# Patient Record
Sex: FEMALE | Race: WHITE | NOT HISPANIC OR LATINO | URBAN - METROPOLITAN AREA
[De-identification: names, ages, dates, MRNs, and addresses within clinical notes are randomized per-mention and may not be internally consistent; named-entity substitution may affect disease eponyms.]

---

## 2017-02-13 ENCOUNTER — IMPORTED ENCOUNTER (OUTPATIENT)
Dept: URBAN - METROPOLITAN AREA CLINIC 1 | Facility: CLINIC | Age: 62
End: 2017-02-13

## 2017-02-13 PROBLEM — H25.813: Noted: 2017-02-13

## 2017-02-13 PROBLEM — H40.023: Noted: 2017-02-13

## 2017-02-13 PROBLEM — H04.123: Noted: 2017-02-13

## 2017-02-13 PROBLEM — H43.811: Noted: 2017-02-13

## 2017-02-13 PROCEDURE — 92015 DETERMINE REFRACTIVE STATE: CPT

## 2017-02-13 PROCEDURE — 92014 COMPRE OPH EXAM EST PT 1/>: CPT

## 2017-02-13 NOTE — PATIENT DISCUSSION
1.  Glaucoma Suspect OU : (CD 0.6/0.7) Fm Hx. Risk of cupping. IOP stable on no meds. Cont to observe on no meds. Patient is considered high risk. 2.  Cataract OU: Observe for now without intervention. The patient was advised to contact us if any change or worsening of vision3. Dry Eyes OU - Continue ATs TID OU routinely. 4.  PVD w/o Tear OD -stable 5. H/o RK OU 6. Progressive Non-adapter Letter to PCP MRX for glasses given advised patient to get glasses rx as she is not legal to drive without glasses. Return for an appointment in 1 yr 27 OCT with Dr. Maggie Ornelas.

## 2018-02-12 ENCOUNTER — IMPORTED ENCOUNTER (OUTPATIENT)
Dept: URBAN - METROPOLITAN AREA CLINIC 1 | Facility: CLINIC | Age: 63
End: 2018-02-12

## 2018-02-12 PROBLEM — H43.811: Noted: 2018-02-12

## 2018-02-12 PROBLEM — H25.813: Noted: 2018-02-12

## 2018-02-12 PROBLEM — H40.023: Noted: 2018-02-12

## 2018-02-12 PROBLEM — H04.123: Noted: 2018-02-12

## 2018-02-12 PROCEDURE — 92014 COMPRE OPH EXAM EST PT 1/>: CPT

## 2018-02-12 PROCEDURE — 92015 DETERMINE REFRACTIVE STATE: CPT

## 2018-02-12 PROCEDURE — 92133 CPTRZD OPH DX IMG PST SGM ON: CPT

## 2018-02-12 NOTE — PATIENT DISCUSSION
1.  Glaucoma Suspect OU (CD 0.60/0.75): OCT remains normal. Family hx. Patient is considered high risk. 2. Cataract OU: Observe for now without intervention. The patient was advised to contact us if any change or worsening of vision3. Dry Eyes OU -The use/continuation of artificial tears were recommended. 4.  PVD w/o Tear OD - RD precautions. 5.  H/o RK OU 6. Progressive Non-adapterMRX for glasses givenReturn for an appointment in 1 year 30/HVF/glare with Dr. Moses Zelaya.

## 2019-02-07 ENCOUNTER — IMPORTED ENCOUNTER (OUTPATIENT)
Dept: URBAN - METROPOLITAN AREA CLINIC 1 | Facility: CLINIC | Age: 64
End: 2019-02-07

## 2019-02-07 PROBLEM — H43.813: Noted: 2019-02-07

## 2019-02-07 PROCEDURE — 92012 INTRM OPH EXAM EST PATIENT: CPT

## 2019-02-07 NOTE — PATIENT DISCUSSION
1.  PVD OU (OD- Old OS- New) no holes tears or detachments-  RD precautions. Reassurance. 2.  Glaucoma Suspect OU (CD 0.60/0.75): OCT remains normal. Family hx. Patient is considered high risk. 3. Cataract OU: Observe for now without intervention. The patient was advised to contact us if any change or worsening of vision4. Dry Eyes OU - Cont ATs TID OU Routinely. 5.  H/o RK OU 6. Progressive Non-adapterReturn for an appointment as scheduled with Dr. Omid Raman.

## 2019-02-25 ENCOUNTER — IMPORTED ENCOUNTER (OUTPATIENT)
Dept: URBAN - METROPOLITAN AREA CLINIC 1 | Facility: CLINIC | Age: 64
End: 2019-02-25

## 2019-02-25 PROBLEM — H43.813: Noted: 2019-02-25

## 2019-02-25 PROCEDURE — 92012 INTRM OPH EXAM EST PATIENT: CPT

## 2019-02-25 NOTE — PATIENT DISCUSSION
1.  PVD OU (OD- Old OS- New) no holes tears or detachments noted today-  RD precautions. Reassurance. 2.  Glaucoma Suspect OU (CD 0.60/0.75)Family hx. IOP stable on no gtts. Cont to observe. Patient is considered high risk. 3. Cataract OU: Observe for now without intervention. The patient was advised to contact us if any change or worsening of vision4. Dry Eyes OU - Cont ATs TID OU Routinely. 5.  H/o RK OU 6. Progressive Non-adapterCancel 3/26 appt & move out to 6 moReturn for an appointment in 6 mo 30 Vf 24-2 OU with Dr. Javier Syed.

## 2019-08-30 ENCOUNTER — IMPORTED ENCOUNTER (OUTPATIENT)
Dept: URBAN - METROPOLITAN AREA CLINIC 1 | Facility: CLINIC | Age: 64
End: 2019-08-30

## 2019-08-30 PROBLEM — H40.023: Noted: 2019-08-30

## 2019-08-30 PROBLEM — H25.813: Noted: 2019-08-30

## 2019-08-30 PROCEDURE — 92014 COMPRE OPH EXAM EST PT 1/>: CPT

## 2019-08-30 PROCEDURE — 92083 EXTENDED VISUAL FIELD XM: CPT

## 2019-08-30 NOTE — PATIENT DISCUSSION
1.  Glaucoma Suspect OU (CD 0.60/0.75)Family Hx (Aunt). IOP stable on no gtts. VF WNL OD OS possible early superior arcuate vs. artifact. Treat with any future progression but will cont to observe off gtts OU at this time. Patient is considered high risk. 2. Cataract OU: Observe for now without intervention. The patient was advised to contact us if any change or worsening of vision3. PVD OU- Stable RD precautions. Reassurance. of vision4. Dry Eyes OU - Cont ATs TID OU Routinely. 5.  H/o RK OU 6. Progressive Non-adapterMRx deferred today Return for an appointment in 6 mo 10 dfe OCT glare with Dr. Jackie Zamora.

## 2020-02-28 ENCOUNTER — IMPORTED ENCOUNTER (OUTPATIENT)
Dept: URBAN - METROPOLITAN AREA CLINIC 1 | Facility: CLINIC | Age: 65
End: 2020-02-28

## 2020-02-28 PROBLEM — H04.123: Noted: 2020-02-28

## 2020-02-28 PROBLEM — H25.813: Noted: 2020-02-28

## 2020-02-28 PROBLEM — H40.023: Noted: 2020-02-28

## 2020-02-28 PROBLEM — H43.813: Noted: 2020-02-28

## 2020-02-28 PROCEDURE — 92015 DETERMINE REFRACTIVE STATE: CPT

## 2020-02-28 PROCEDURE — 92133 CPTRZD OPH DX IMG PST SGM ON: CPT

## 2020-02-28 PROCEDURE — 92012 INTRM OPH EXAM EST PATIENT: CPT

## 2020-02-28 NOTE — PATIENT DISCUSSION
1.  Glaucoma Suspect OU (CD 0.60/0.75): OCT WNL OU. IOP stable. Family hx (Aunt). Patient is considered high risk. Condition was discussed with patient and patient understands. Will continue to monitor patient for any progression in condition. Patient was advised to call us with any problems questions or concerns. 2.  Cataract OU:  Visually Significant secondary to glare discussed the risks benefits alternatives and limitations of cataract surgery. The patient stated a full understanding and a desire to proceed with the procedure. The patient will need to return for preop appointment with cataract measurements and to have any additional questions answered and start pre-operative eye drops as directed. **Guarded visual prgonosis due to h/o RK OU.  *Not MF or LenSx candidate Phaco PCL OS then OD Otherwise follow-up 6 months 30/glare 3. Dry Eyes OU - Recommend ATs TID OU routinely 4. PVD OU - RD precautions. 5.  H/o RK OU 6. Progressive Non-adapterReturn for an appointment for Ascan/H and P. with Dr. Jackie Alexis.

## 2020-05-18 ENCOUNTER — IMPORTED ENCOUNTER (OUTPATIENT)
Dept: URBAN - METROPOLITAN AREA CLINIC 1 | Facility: CLINIC | Age: 65
End: 2020-05-18

## 2020-05-18 PROBLEM — H25.813: Noted: 2020-05-18

## 2020-05-18 PROCEDURE — 92136 OPHTHALMIC BIOMETRY: CPT

## 2020-05-18 NOTE — PATIENT DISCUSSION
1. Cataract OU:  Visually Significant secondary to glare discussed the risks benefits alternatives and limitations of cataract surgery. The patient stated a full understanding and a desire to proceed with the procedure. Discussed with patient if PO Gtts are more than $120 for all three combined when filling at their Pharmacy please call our office to request generic substitutions. Guarded visual prognosis due to history of refractive surgery. The accuracy of the IOL selection may be affected by prior refractive surgery and patient should expect to wear glasses after Phaco. Thoroughly discussed with patient patient understood. Phaco PCL OS then OD Phaco PCL OS 2. Glaucoma Suspect OU (CD 0.60/0.75): IOP stable. Family hx (Aunt). Patient is considered high risk. 3.  Dry Eyes OU - Recommend ATs TID OU routinely 4. PVD OU - RD precautions. 5.  H/o RK OU 6.   Progressive Non-adapter

## 2020-05-27 ENCOUNTER — IMPORTED ENCOUNTER (OUTPATIENT)
Dept: URBAN - METROPOLITAN AREA CLINIC 1 | Facility: CLINIC | Age: 65
End: 2020-05-27

## 2020-05-28 ENCOUNTER — IMPORTED ENCOUNTER (OUTPATIENT)
Dept: URBAN - METROPOLITAN AREA CLINIC 1 | Facility: CLINIC | Age: 65
End: 2020-05-28

## 2020-05-28 PROBLEM — Z96.1: Noted: 2020-05-28

## 2020-05-28 PROCEDURE — 99024 POSTOP FOLLOW-UP VISIT: CPT

## 2020-05-28 NOTE — PATIENT DISCUSSION
POD#1 CE/IOL Toric OS doing well. Use Lotemax BID OS Prolensa Qdaily OS Ocuflox TID OS: Use all three gtts through completion of PO gtt chart regimen/ Per our instructions given to patient.   Post op Warnings Reiterated RTC as scheduled

## 2020-06-04 ENCOUNTER — IMPORTED ENCOUNTER (OUTPATIENT)
Dept: URBAN - METROPOLITAN AREA CLINIC 1 | Facility: CLINIC | Age: 65
End: 2020-06-04

## 2020-06-04 PROBLEM — H25.811: Noted: 2020-06-04

## 2020-06-04 PROCEDURE — 92136 OPHTHALMIC BIOMETRY: CPT

## 2020-06-04 NOTE — PATIENT DISCUSSION
1.  Cataract OD: Visually Significant secondary to glare discussed the risks benefits alternatives and limitations of cataract surgery. The patient stated a full understanding and a desire to proceed with the procedure. Discussed with patient if PO Gtts are more than $120 for all three combined when filling at their Pharmacy please call our office to request generic substitutions. Guarded visual prognosis due to history of refractive surgery. The accuracy of the IOL selection may be affected by prior refractive surgery and patient should expect to wear glasses after Phaco. Thoroughly discussed with patient patient understood. Phaco PCL OD 2. POW#3  CE/IOL OS (Toric) doing well. *H/o RK  Use Lotemax BID OS and Bromsite Qdaily OS: Use gtts through completion of PO gtt regimen.   F/u as scheduled 2nd eye

## 2020-06-19 ENCOUNTER — IMPORTED ENCOUNTER (OUTPATIENT)
Dept: URBAN - METROPOLITAN AREA CLINIC 1 | Facility: CLINIC | Age: 65
End: 2020-06-19

## 2020-06-20 ENCOUNTER — IMPORTED ENCOUNTER (OUTPATIENT)
Dept: URBAN - METROPOLITAN AREA CLINIC 1 | Facility: CLINIC | Age: 65
End: 2020-06-20

## 2020-06-20 PROBLEM — Z96.1: Noted: 2020-06-20

## 2020-06-20 PROCEDURE — 99024 POSTOP FOLLOW-UP VISIT: CPT

## 2020-07-14 ENCOUNTER — IMPORTED ENCOUNTER (OUTPATIENT)
Dept: URBAN - METROPOLITAN AREA CLINIC 1 | Facility: CLINIC | Age: 65
End: 2020-07-14

## 2020-07-14 PROBLEM — Z96.1: Noted: 2020-07-14

## 2020-07-14 PROCEDURE — 99024 POSTOP FOLLOW-UP VISIT: CPT

## 2020-07-14 NOTE — PATIENT DISCUSSION
POM#1 CE/IOL OU (Toric OU) doing well. *H/o RK OU* Use Lotemax BID OD and Bromsite Qdaily OD: Use gtts through completion of PO gtt regimen. MRX for glasses given. Return for an appointment in February 30/OCT with Dr. Claudeen Cobble.

## 2021-02-16 ENCOUNTER — IMPORTED ENCOUNTER (OUTPATIENT)
Dept: URBAN - METROPOLITAN AREA CLINIC 1 | Facility: CLINIC | Age: 66
End: 2021-02-16

## 2021-02-16 PROBLEM — Z96.1: Noted: 2021-02-16

## 2021-02-16 PROBLEM — H16.143: Noted: 2021-02-16

## 2021-02-16 PROBLEM — H04.123: Noted: 2021-02-16

## 2021-02-16 PROBLEM — H26.493: Noted: 2021-02-16

## 2021-02-16 PROBLEM — H40.023: Noted: 2021-02-16

## 2021-02-16 PROCEDURE — 92133 CPTRZD OPH DX IMG PST SGM ON: CPT

## 2021-02-16 PROCEDURE — 99214 OFFICE O/P EST MOD 30 MIN: CPT

## 2021-02-16 NOTE — PATIENT DISCUSSION
1.  Glaucoma Suspect OU (CD 0.60/0.75): IOP stable. OCT today minimal thinning OD WNL OS. Family hx (Aunt). Patient is considered high risk. 2.  ENRRIQUE w/ PEK OU - Recommend ATs TID OU routinely3. PCO OU - (Posterior Capsule Opacification)   Observe and consider yag cap when pt feels pco visually significant and visual acuity decreases to appropriate level. 4. Pseudophakia OU - Toric OU Doing well. 5.  PVD OU - RD precautions. 6.  H/o RK OU 7. Progressive Non-adapter. Return for an appointment in 1 year for a 30 with Dr. Omid Min.

## 2022-02-22 ENCOUNTER — ESTABLISHED PATIENT (OUTPATIENT)
Dept: URBAN - METROPOLITAN AREA CLINIC 1 | Facility: CLINIC | Age: 67
End: 2022-02-22

## 2022-02-22 DIAGNOSIS — Z96.1: ICD-10-CM

## 2022-02-22 DIAGNOSIS — H40.023: ICD-10-CM

## 2022-02-22 DIAGNOSIS — H04.123: ICD-10-CM

## 2022-02-22 DIAGNOSIS — H26.493: ICD-10-CM

## 2022-02-22 DIAGNOSIS — H43.813: ICD-10-CM

## 2022-02-22 PROCEDURE — 92014 COMPRE OPH EXAM EST PT 1/>: CPT

## 2022-02-22 PROCEDURE — 92133 CPTRZD OPH DX IMG PST SGM ON: CPT

## 2022-02-22 ASSESSMENT — TONOMETRY
OD_IOP_MMHG: 10
OS_IOP_MMHG: 10

## 2022-02-22 ASSESSMENT — VISUAL ACUITY
OD_SC: 20/20-1
OS_SC: 20/25
OS_CC: J1
OD_CC: J1

## 2022-02-22 NOTE — PATIENT DISCUSSION
Observe and consider YAG cap when pt feels pco visually significant and visual acuity decreases to appropriate level.

## 2022-02-22 NOTE — PATIENT DISCUSSION
(CD 0.6,0.75) IOP stable OU. OCT today remains without progression. (+) Family Hx (Aunt). Patient is considered high risk. Condition was discussed with patient and patient understands. Will continue to monitor patient for any progression in condition. Patient was advised to call us with any problems, questions, or concerns.

## 2022-04-02 ASSESSMENT — VISUAL ACUITY
OS_CC: 20/20
OD_CC: 20/60
OD_GLARE: 20/100
OS_PH: SC 20/25
OS_GLARE: 20/100
OS_SC: 20/20-1
OD_SC: 20/40
OS_CC: J6
OD_CC: 20/40
OS_GLARE: 20/150
OD_GLARE: 20/150
OS_GLARE: 20/150
OS_CC: 20/25
OD_CC: 20/70
OD_SC: 20/30
OS_CC: 20/70+2
OD_CC: 20/20-1
OS_SC: 20/30
OD_CC: 20/25
OS_CC: 20/70
OS_CC: 20/25
OD_CC: 20/25
OS_GLARE: 20/150
OS_CC: 20/50
OS_SC: 20/40
OS_SC: 20/25
OD_CC: 20/60
OS_CC: 20/60
OS_GLARE: 20/100
OD_GLARE: 20/150
OS_SC: 20/30
OS_SC: 20/30
OS_CC: 20/70+2
OS_CC: 20/20
OD_CC: J3
OD_GLARE: 20/150
OD_GLARE: 20/100
OD_SC: 20/30-2
OD_SC: 20/50
OD_SC: 20/40
OD_SC: 20/25-1

## 2022-04-02 ASSESSMENT — TONOMETRY
OD_IOP_MMHG: 14
OD_IOP_MMHG: 14
OS_IOP_MMHG: 12
OD_IOP_MMHG: 13
OS_IOP_MMHG: 14
OD_IOP_MMHG: 14
OD_IOP_MMHG: 14
OS_IOP_MMHG: 14
OD_IOP_MMHG: 14
OD_IOP_MMHG: 11
OS_IOP_MMHG: 16
OS_IOP_MMHG: 10
OD_IOP_MMHG: 13
OD_IOP_MMHG: 14
OS_IOP_MMHG: 12
OS_IOP_MMHG: 14
OD_IOP_MMHG: 14
OS_IOP_MMHG: 12
OS_IOP_MMHG: 8
OS_IOP_MMHG: 17
OS_IOP_MMHG: 14
OS_IOP_MMHG: 14

## 2022-04-02 ASSESSMENT — KERATOMETRY
OD_AXISANGLE2_DEGREES: 083
OD_AXISANGLE_DEGREES: 173
OD_K1POWER_DIOPTERS: 40.00
OS_K2POWER_DIOPTERS: 41.50
OS_K1POWER_DIOPTERS: 40.25
OS_AXISANGLE_DEGREES: 021
OD_AXISANGLE2_DEGREES: 083
OD_K2POWER_DIOPTERS: 43.25
OD_AXISANGLE_DEGREES: 173
OS_K2POWER_DIOPTERS: 42.00
OS_AXISANGLE2_DEGREES: 111
OD_K1POWER_DIOPTERS: 40.25
OS_AXISANGLE_DEGREES: 015
OS_AXISANGLE2_DEGREES: 105
OS_K1POWER_DIOPTERS: 40.25
OD_K2POWER_DIOPTERS: 43.25

## 2023-07-10 ENCOUNTER — COMPREHENSIVE EXAM (OUTPATIENT)
Dept: URBAN - METROPOLITAN AREA CLINIC 1 | Facility: CLINIC | Age: 68
End: 2023-07-10

## 2023-07-10 DIAGNOSIS — H26.493: ICD-10-CM

## 2023-07-10 DIAGNOSIS — H43.813: ICD-10-CM

## 2023-07-10 DIAGNOSIS — H16.143: ICD-10-CM

## 2023-07-10 DIAGNOSIS — H40.023: ICD-10-CM

## 2023-07-10 DIAGNOSIS — H04.123: ICD-10-CM

## 2023-07-10 PROCEDURE — 92133 CPTRZD OPH DX IMG PST SGM ON: CPT

## 2023-07-10 PROCEDURE — 99214 OFFICE O/P EST MOD 30 MIN: CPT

## 2023-07-10 PROCEDURE — 92015 DETERMINE REFRACTIVE STATE: CPT

## 2023-07-10 ASSESSMENT — VISUAL ACUITY
OS_SC: 20/25-1
OS_BAT: 20/50
OD_SC: 20/25-2
OD_BAT: 20/50

## 2023-07-10 ASSESSMENT — TONOMETRY
OD_IOP_MMHG: 12
OS_IOP_MMHG: 12

## 2023-07-28 ENCOUNTER — CLINIC PROCEDURE ONLY (OUTPATIENT)
Dept: URBAN - METROPOLITAN AREA CLINIC 1 | Facility: CLINIC | Age: 68
End: 2023-07-28

## 2023-07-28 DIAGNOSIS — Z96.1: ICD-10-CM

## 2023-07-28 DIAGNOSIS — H26.493: ICD-10-CM

## 2023-07-28 PROCEDURE — 66821 AFTER CATARACT LASER SURGERY: CPT

## 2023-08-25 ENCOUNTER — CLINIC PROCEDURE ONLY (OUTPATIENT)
Dept: URBAN - METROPOLITAN AREA CLINIC 1 | Facility: CLINIC | Age: 68
End: 2023-08-25

## 2023-08-25 DIAGNOSIS — Z96.1: ICD-10-CM

## 2023-08-25 DIAGNOSIS — H26.492: ICD-10-CM

## 2023-08-25 PROCEDURE — 66821 AFTER CATARACT LASER SURGERY: CPT

## 2023-09-07 ENCOUNTER — POST-OP (OUTPATIENT)
Dept: URBAN - METROPOLITAN AREA CLINIC 1 | Facility: CLINIC | Age: 68
End: 2023-09-07

## 2023-09-07 DIAGNOSIS — Z98.890: ICD-10-CM

## 2023-09-07 PROCEDURE — 99024 POSTOP FOLLOW-UP VISIT: CPT

## 2023-09-07 ASSESSMENT — VISUAL ACUITY
OS_SC: J2
OD_SC: J3
OD_SC: 20/25
OS_SC: 20/25

## 2023-09-07 ASSESSMENT — TONOMETRY
OD_IOP_MMHG: 14
OS_IOP_MMHG: 14

## 2024-07-03 ENCOUNTER — COMPREHENSIVE EXAM (OUTPATIENT)
Dept: URBAN - METROPOLITAN AREA CLINIC 1 | Facility: CLINIC | Age: 69
End: 2024-07-03

## 2024-07-03 DIAGNOSIS — H43.813: ICD-10-CM

## 2024-07-03 DIAGNOSIS — H40.013: ICD-10-CM

## 2024-07-03 DIAGNOSIS — H04.123: ICD-10-CM

## 2024-07-03 DIAGNOSIS — H16.143: ICD-10-CM

## 2024-07-03 PROCEDURE — 92133 CPTRZD OPH DX IMG PST SGM ON: CPT

## 2024-07-03 PROCEDURE — 99214 OFFICE O/P EST MOD 30 MIN: CPT

## 2024-07-03 ASSESSMENT — TONOMETRY
OD_IOP_MMHG: 14
OS_IOP_MMHG: 14

## 2024-07-03 ASSESSMENT — VISUAL ACUITY
OD_SC: 20/25
OS_SC: 20/20-1